# Patient Record
(demographics unavailable — no encounter records)

---

## 2025-01-23 NOTE — DISCUSSION/SUMMARY
[FreeTextEntry1] : #Palpitations - Encouraged to decrease caffeine intake as much as possible - Encourage to continue exercise - ECG sinus rhythm today - 14 day Ziopatch ordered to assess for arrhythmia - Echocardiogram to assess for structural heart abnormalities.  - Stress-lowering therapies and exercises were strongly recommended  #HLD: continue statin  #tobacco abuse: cessation encouraged [EKG obtained to assist in diagnosis and management of assessed problem(s)] : EKG obtained to assist in diagnosis and management of assessed problem(s)

## 2025-01-23 NOTE — HISTORY OF PRESENT ILLNESS
[FreeTextEntry1] : Ms. ERIKA DUBON is a very pleasant 56 year woman with a hx of HLD on statin therapy, tobacco abuse presenting for evaluation of palpitations. These have occurred every day x 1 month. She has not had dizziness or lightheadedness. Mostly occur at rest. She drinks 2 cups of coffee per day.  Otherwise, denies orthopnea, paroxysmal nocturnal dyspnea, lower extremity edema, unexplained weight gain or dyspnea on exertion.  ECG Today nsr without ischemic changes BP wnl

## 2025-01-28 NOTE — ASSESSMENT
[FreeTextEntry1] : -F/u labs drawn in office today -Further recs pending lab results -RTO 6mo or sooner if needed

## 2025-01-28 NOTE — HISTORY OF PRESENT ILLNESS
[FreeTextEntry1] :  annual well check and to establish care w/ a new PMD [de-identified] : -PMH: h/o Tristan tumor s/p right oophorectomy, Dad  from MI @ 58yo -SH: . 3 children. Current smoker (>30 pack yr Hx). Occasional EtOH use.   ERIKA is a 56 year F whom is here today for an annual well check and to establish care w/ a new PMD Today, pt reports feeling well and is w/o complaints.   Specialists Involved: -OBGYN: Dr. Rivera -Cardio: Dr. Morris -GI: Dr. Jeffries  -Vaccines: Needs Prevnar 20, Flu, Shingles -DEXA: 2021. Osteopenia -Mammogram: 3/2024. Per patient prior Bx normal and repeat imaging stable. Monitored by Gyn -Pap Smear: 2024 -Colonoscopy: 2023. Repeat 7yrs -Lung CT CA Screenin2024 -FH of Colon, breast or ovarian CA: None known  -GERD: Remains on Omeprazole 20mg QD -HLD: Remains on Crestor 5mg QD -Nicotine Use DO: Remains on Bupropion 150mg QD -Osteopenia

## 2025-01-28 NOTE — HEALTH RISK ASSESSMENT
[Excellent] : ~his/her~  mood as  excellent [Yes] : Yes [Monthly or less (1 pt)] : Monthly or less (1 point) [1 or 2 (0 pts)] : 1 or 2 (0 points) [Never (0 pts)] : Never (0 points) [No] : In the past 12 months have you used drugs other than those required for medical reasons? No [No falls in past year] : Patient reported no falls in the past year [0] : 2) Feeling down, depressed, or hopeless: Not at all (0) [PHQ-2 Negative - No further assessment needed] : PHQ-2 Negative - No further assessment needed [2] : 2 [Patient reported bone density results were abnormal] : Patient reported bone density results were abnormal [Patient reported colonoscopy was abnormal] : Patient reported colonoscopy was abnormal [HIV test declined] : HIV test declined [Hepatitis C test declined] : Hepatitis C test declined [Reviewed no changes] : Reviewed, no changes [Current] : Current [20 or more] : 20 or more [Audit-CScore] : 1 [ZNY5Cvmdc] : 0 [LowDoseCTScan] : 02/24 [Patient reported mammogram was normal] : Patient reported mammogram was normal [Patient reported PAP Smear was normal] : Patient reported PAP Smear was normal [Change in mental status noted] : No change in mental status noted [] :  [# Of Children ___] : has [unfilled] children [MammogramDate] : 03/24 [MammogramComments] : per patient  [PapSmearDate] : 01/24 [PapSmearComments] : per patient  [BoneDensityDate] : 12/21 [ColonoscopyDate] : 06/23 [AdvancecareDate] : 01/25

## 2025-05-21 NOTE — HISTORY OF PRESENT ILLNESS
[Current] : Current [TextBox_13] : Ms. DUBON is a 56 year old female with a history of high cholesterol. Reviewed and confirmed that the patient meets screening eligibility criteria:  56 years old   Smoking Status: Current smoker  Number of pack(s) per day: 1 Number of years smoked: 34 Number of pack years smokin  No signs/symptoms of lung cancer.  No personal history of lung cancer.  History of lung cancer in a first degree relative, her mother.    [PacksperYear] : 34